# Patient Record
Sex: MALE | Race: WHITE | NOT HISPANIC OR LATINO | Employment: UNEMPLOYED | URBAN - METROPOLITAN AREA
[De-identification: names, ages, dates, MRNs, and addresses within clinical notes are randomized per-mention and may not be internally consistent; named-entity substitution may affect disease eponyms.]

---

## 2023-01-09 NOTE — PATIENT DISCUSSION
Pt will try Shruthi -3.50/-1.00 trials. If pt like he can purchase, it not we may need to correct for astigmatism OS.

## 2023-01-24 ENCOUNTER — EMERGENCY VISIT (OUTPATIENT)
Dept: URBAN - METROPOLITAN AREA CLINIC 37 | Facility: CLINIC | Age: 73
End: 2023-01-24

## 2023-01-24 DIAGNOSIS — H40.021: ICD-10-CM

## 2023-01-24 PROCEDURE — 92250 FUNDUS PHOTOGRAPHY W/I&R: CPT

## 2023-01-24 PROCEDURE — 92014 COMPRE OPH EXAM EST PT 1/>: CPT

## 2023-01-24 ASSESSMENT — TONOMETRY: OD_IOP_MMHG: 23

## 2023-01-24 ASSESSMENT — VISUAL ACUITY: OD_CC: 20/40

## 2023-08-01 ENCOUNTER — FOLLOW UP (OUTPATIENT)
Dept: URBAN - METROPOLITAN AREA CLINIC 37 | Facility: CLINIC | Age: 73
End: 2023-08-01

## 2023-08-01 DIAGNOSIS — H40.021: ICD-10-CM

## 2023-08-01 PROCEDURE — 92250 FUNDUS PHOTOGRAPHY W/I&R: CPT

## 2023-08-01 PROCEDURE — 92014 COMPRE OPH EXAM EST PT 1/>: CPT

## 2023-08-01 ASSESSMENT — VISUAL ACUITY
OU_CC: 20/40
OD_CC: 20/40

## 2023-08-01 ASSESSMENT — TONOMETRY: OD_IOP_MMHG: 14

## 2024-02-19 ENCOUNTER — COMPREHENSIVE EXAM (OUTPATIENT)
Dept: URBAN - METROPOLITAN AREA CLINIC 38 | Facility: CLINIC | Age: 74
End: 2024-02-19

## 2024-02-19 DIAGNOSIS — H04.123: ICD-10-CM

## 2024-02-19 DIAGNOSIS — H40.021: ICD-10-CM

## 2024-02-19 DIAGNOSIS — H52.7: ICD-10-CM

## 2024-02-19 PROCEDURE — 92014 COMPRE OPH EXAM EST PT 1/>: CPT

## 2024-02-19 PROCEDURE — 92015 DETERMINE REFRACTIVE STATE: CPT

## 2024-02-19 ASSESSMENT — TONOMETRY: OD_IOP_MMHG: 18

## 2024-02-19 ASSESSMENT — VISUAL ACUITY
OU_CC: J6
OD_CC: J6